# Patient Record
Sex: MALE | Race: WHITE | Employment: UNEMPLOYED | URBAN - METROPOLITAN AREA
[De-identification: names, ages, dates, MRNs, and addresses within clinical notes are randomized per-mention and may not be internally consistent; named-entity substitution may affect disease eponyms.]

---

## 2019-04-16 ENCOUNTER — APPOINTMENT (OUTPATIENT)
Dept: GENERAL RADIOLOGY | Age: 44
End: 2019-04-16
Payer: MEDICARE

## 2019-04-16 ENCOUNTER — APPOINTMENT (OUTPATIENT)
Dept: CT IMAGING | Age: 44
End: 2019-04-16
Payer: MEDICARE

## 2019-04-16 ENCOUNTER — HOSPITAL ENCOUNTER (OUTPATIENT)
Age: 44
Setting detail: OBSERVATION
Discharge: HOME | End: 2019-04-17
Attending: EMERGENCY MEDICINE | Admitting: INTERNAL MEDICINE
Payer: MEDICARE

## 2019-04-16 DIAGNOSIS — G45.9 TIA (TRANSIENT ISCHEMIC ATTACK): Primary | ICD-10-CM

## 2019-04-16 PROBLEM — F43.10 PTSD (POST-TRAUMATIC STRESS DISORDER): Status: ACTIVE | Noted: 2019-04-16

## 2019-04-16 PROBLEM — R29.90 STROKE-LIKE SYMPTOMS: Status: ACTIVE | Noted: 2019-04-16

## 2019-04-16 PROBLEM — I10 HTN (HYPERTENSION): Status: ACTIVE | Noted: 2019-04-16

## 2019-04-16 PROBLEM — E78.5 HLD (HYPERLIPIDEMIA): Status: ACTIVE | Noted: 2019-04-16

## 2019-04-16 LAB
ALBUMIN SERPL-MCNC: 4.2 G/DL (ref 3.5–5.2)
ALP BLD-CCNC: 79 U/L (ref 40–129)
ALT SERPL-CCNC: 25 U/L (ref 0–40)
ANION GAP SERPL CALCULATED.3IONS-SCNC: 8 MMOL/L (ref 7–16)
APTT: 29.8 SEC (ref 24.5–35.1)
AST SERPL-CCNC: 31 U/L (ref 0–39)
BASOPHILS ABSOLUTE: 0 E9/L (ref 0–0.2)
BASOPHILS RELATIVE PERCENT: 0.6 % (ref 0–2)
BILIRUB SERPL-MCNC: 0.8 MG/DL (ref 0–1.2)
BILIRUBIN URINE: NEGATIVE
BLOOD, URINE: NEGATIVE
BUN BLDV-MCNC: 11 MG/DL (ref 6–20)
CALCIUM SERPL-MCNC: 9.1 MG/DL (ref 8.6–10.2)
CHLORIDE BLD-SCNC: 96 MMOL/L (ref 98–107)
CHP ED QC CHECK: YES
CLARITY: CLEAR
CO2: 33 MMOL/L (ref 22–29)
COLOR: YELLOW
CREAT SERPL-MCNC: 1 MG/DL (ref 0.7–1.2)
EOSINOPHILS ABSOLUTE: 0 E9/L (ref 0.05–0.5)
EOSINOPHILS RELATIVE PERCENT: 1.7 % (ref 0–6)
GFR AFRICAN AMERICAN: >60
GFR NON-AFRICAN AMERICAN: >60 ML/MIN/1.73
GLUCOSE BLD-MCNC: 227 MG/DL
GLUCOSE BLD-MCNC: 227 MG/DL (ref 74–99)
GLUCOSE URINE: NEGATIVE MG/DL
HCT VFR BLD CALC: 42.6 % (ref 37–54)
HEMOGLOBIN: 14.8 G/DL (ref 12.5–16.5)
INR BLD: 1.1
KETONES, URINE: NEGATIVE MG/DL
LEUKOCYTE ESTERASE, URINE: NEGATIVE
LYMPHOCYTES ABSOLUTE: 3.06 E9/L (ref 1.5–4)
LYMPHOCYTES RELATIVE PERCENT: 29.8 % (ref 20–42)
MCH RBC QN AUTO: 31.3 PG (ref 26–35)
MCHC RBC AUTO-ENTMCNC: 34.7 % (ref 32–34.5)
MCV RBC AUTO: 90.1 FL (ref 80–99.9)
MONOCYTES ABSOLUTE: 0.71 E9/L (ref 0.1–0.95)
MONOCYTES RELATIVE PERCENT: 7.3 % (ref 2–12)
NEUTROPHILS ABSOLUTE: 6.43 E9/L (ref 1.8–7.3)
NEUTROPHILS RELATIVE PERCENT: 62.9 % (ref 43–80)
NITRITE, URINE: NEGATIVE
PDW BLD-RTO: 12.3 FL (ref 11.5–15)
PH UA: 7 (ref 5–9)
PLATELET # BLD: 272 E9/L (ref 130–450)
PMV BLD AUTO: 9.8 FL (ref 7–12)
POLYCHROMASIA: ABNORMAL
POTASSIUM SERPL-SCNC: 3.4 MMOL/L (ref 3.5–5)
PROTEIN UA: NEGATIVE MG/DL
PROTHROMBIN TIME: 12.5 SEC (ref 9.3–12.4)
RBC # BLD: 4.73 E12/L (ref 3.8–5.8)
SODIUM BLD-SCNC: 137 MMOL/L (ref 132–146)
SPECIFIC GRAVITY UA: <=1.005 (ref 1–1.03)
TOTAL PROTEIN: 7.5 G/DL (ref 6.4–8.3)
TROPONIN: <0.01 NG/ML (ref 0–0.03)
UROBILINOGEN, URINE: 0.2 E.U./DL
WBC # BLD: 10.2 E9/L (ref 4.5–11.5)

## 2019-04-16 PROCEDURE — G0378 HOSPITAL OBSERVATION PER HR: HCPCS

## 2019-04-16 PROCEDURE — 6370000000 HC RX 637 (ALT 250 FOR IP): Performed by: CLINICAL NURSE SPECIALIST

## 2019-04-16 PROCEDURE — 6360000004 HC RX CONTRAST MEDICATION: Performed by: RADIOLOGY

## 2019-04-16 PROCEDURE — 85610 PROTHROMBIN TIME: CPT

## 2019-04-16 PROCEDURE — 71275 CT ANGIOGRAPHY CHEST: CPT

## 2019-04-16 PROCEDURE — 70496 CT ANGIOGRAPHY HEAD: CPT

## 2019-04-16 PROCEDURE — 99219 PR INITIAL OBSERVATION CARE/DAY 50 MINUTES: CPT | Performed by: CLINICAL NURSE SPECIALIST

## 2019-04-16 PROCEDURE — 80053 COMPREHEN METABOLIC PANEL: CPT

## 2019-04-16 PROCEDURE — 96372 THER/PROPH/DIAG INJ SC/IM: CPT

## 2019-04-16 PROCEDURE — 70450 CT HEAD/BRAIN W/O DYE: CPT

## 2019-04-16 PROCEDURE — 71045 X-RAY EXAM CHEST 1 VIEW: CPT

## 2019-04-16 PROCEDURE — 6360000002 HC RX W HCPCS: Performed by: CLINICAL NURSE SPECIALIST

## 2019-04-16 PROCEDURE — 84484 ASSAY OF TROPONIN QUANT: CPT

## 2019-04-16 PROCEDURE — 81003 URINALYSIS AUTO W/O SCOPE: CPT

## 2019-04-16 PROCEDURE — 85025 COMPLETE CBC W/AUTO DIFF WBC: CPT

## 2019-04-16 PROCEDURE — 85730 THROMBOPLASTIN TIME PARTIAL: CPT

## 2019-04-16 PROCEDURE — 0042T CT BRAIN PERFUSION: CPT

## 2019-04-16 PROCEDURE — 36415 COLL VENOUS BLD VENIPUNCTURE: CPT

## 2019-04-16 PROCEDURE — 2580000003 HC RX 258: Performed by: CLINICAL NURSE SPECIALIST

## 2019-04-16 PROCEDURE — 93005 ELECTROCARDIOGRAM TRACING: CPT | Performed by: EMERGENCY MEDICINE

## 2019-04-16 PROCEDURE — 70498 CT ANGIOGRAPHY NECK: CPT

## 2019-04-16 PROCEDURE — 99285 EMERGENCY DEPT VISIT HI MDM: CPT

## 2019-04-16 RX ORDER — PROPRANOLOL HYDROCHLORIDE 20 MG/1
20 TABLET ORAL DAILY
Status: DISCONTINUED | OUTPATIENT
Start: 2019-04-17 | End: 2019-04-17 | Stop reason: HOSPADM

## 2019-04-16 RX ORDER — SODIUM CHLORIDE 0.9 % (FLUSH) 0.9 %
10 SYRINGE (ML) INJECTION EVERY 12 HOURS SCHEDULED
Status: DISCONTINUED | OUTPATIENT
Start: 2019-04-16 | End: 2019-04-17 | Stop reason: HOSPADM

## 2019-04-16 RX ORDER — SODIUM CHLORIDE 0.9 % (FLUSH) 0.9 %
10 SYRINGE (ML) INJECTION
Status: ACTIVE | OUTPATIENT
Start: 2019-04-16 | End: 2019-04-16

## 2019-04-16 RX ORDER — ONDANSETRON 2 MG/ML
4 INJECTION INTRAMUSCULAR; INTRAVENOUS EVERY 6 HOURS PRN
Status: DISCONTINUED | OUTPATIENT
Start: 2019-04-16 | End: 2019-04-17 | Stop reason: HOSPADM

## 2019-04-16 RX ORDER — SODIUM CHLORIDE 0.9 % (FLUSH) 0.9 %
10 SYRINGE (ML) INJECTION PRN
Status: DISCONTINUED | OUTPATIENT
Start: 2019-04-16 | End: 2019-04-17 | Stop reason: HOSPADM

## 2019-04-16 RX ORDER — PANTOPRAZOLE SODIUM 40 MG/1
40 TABLET, DELAYED RELEASE ORAL
Status: DISCONTINUED | OUTPATIENT
Start: 2019-04-16 | End: 2019-04-17 | Stop reason: HOSPADM

## 2019-04-16 RX ADMIN — Medication 10 ML: at 21:06

## 2019-04-16 RX ADMIN — IOPAMIDOL 150 ML: 755 INJECTION, SOLUTION INTRAVENOUS at 16:20

## 2019-04-16 RX ADMIN — ENOXAPARIN SODIUM 40 MG: 40 INJECTION SUBCUTANEOUS at 18:04

## 2019-04-16 RX ADMIN — PANTOPRAZOLE SODIUM 40 MG: 40 TABLET, DELAYED RELEASE ORAL at 18:04

## 2019-04-16 ASSESSMENT — PAIN DESCRIPTION - PAIN TYPE
TYPE: ACUTE PAIN
TYPE: ACUTE PAIN

## 2019-04-16 ASSESSMENT — PAIN DESCRIPTION - LOCATION
LOCATION: HEAD
LOCATION: HEAD

## 2019-04-16 ASSESSMENT — PAIN SCALES - GENERAL
PAINLEVEL_OUTOF10: 7
PAINLEVEL_OUTOF10: 3

## 2019-04-16 ASSESSMENT — PAIN DESCRIPTION - DESCRIPTORS
DESCRIPTORS: DISCOMFORT
DESCRIPTORS: HEADACHE

## 2019-04-16 NOTE — PROGRESS NOTES
Marin Estrada is a 45 y.o. right handed man    Past Medical History:     Past Medical History:   Diagnosis Date    Arthritis     Asthma     Depression     Hyperlipidemia     Hypertension        Past Surgical History:     No past surgical history on file. Allergies:     Patient has no known allergies. Medications:     Prior to Admission medications    Medication Sig Start Date End Date Taking? Authorizing Provider   omeprazole (PRILOSEC) 20 MG capsule Take 20 mg by mouth 2 times daily. Historical Provider, MD   propranolol (INDERAL) 20 MG tablet Take 20 mg by mouth 3 times daily. Historical Provider, MD   naproxen (NAPROSYN) 500 MG tablet Take 500 mg by mouth 2 times daily (with meals). Historical Provider, MD   prazosin (MINIPRESS) 1 MG capsule Take 1 mg by mouth nightly. Historical Provider, MD   methocarbamol (ROBAXIN) 500 MG tablet Take 500 mg by mouth 4 times daily as needed. Historical Provider, MD   sertraline (ZOLOFT) 25 MG tablet Take 25 mg by mouth daily. 2 25MG. TABS AT NIGHT. Historical Provider, MD   Cholecalciferol (VITAMIN D3) 5000 UNITS TABS Take  by mouth. Historical Provider, MD   TESTOSTERONE ENANTHATE IM Inject  into the muscle. Historical Provider, MD       Social History:     Social History     Tobacco Use    Smoking status: Never Smoker    Smokeless tobacco: Never Used   Substance Use Topics    Alcohol use: No    Drug use: No       Review of Systems:     No chest pain or palpitations  No SOB  No vertigo, lightheadedness or loss of consciousness  No falls, tripping or stumbling  No incontinence of bowels or bladder  No itching or bruising appreciated    ROS otherwise negative     Family History:     No family history on file. History of Present Illness:     Patient was admitted with difficulty speaking and left facial numbness. There was concern for stroke but during his admission in ED, his language difficulties improved.     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     He states he has reactions similar to PTSD    He denies any vertigo; visual changes nausea vomiting or diarrhea     States he gets swelling in his neck and suffers with apnea   Underwent surgery and has a device implanted but does not need Cpap    No prior stroke or seizure     He does not sleep well despite surgery   He does not follow with sleep medicine     Objective:   BP (!) 140/94   Pulse 77   Temp 97.5 °F (36.4 °C) (Temporal)   Resp 17   Wt 270 lb 14.4 oz (122.9 kg)   SpO2 96%   BMI 36.74 kg/m²      General appearance: alert, appears stated age and cooperative  Head: Normocephalic, without obvious abnormality, atraumatic  Neck: no adenopathy, no carotid bruit and limited ROM  Lungs: clear to auscultation bilaterally  Heart: regular rate and rhythm  Extremities: no cyanosis or edema  Pulses: 2+ and symmetric  Skin: no rashes or lesions     Mental Status: Alert, oriented to person place and year     Speech: clear  Language: appropriate     Cranial Nerves:  I: smell    II: visual acuity     II: visual fields Full    II: pupils LUPE   III,VII: ptosis None   III,IV,VI: extraocular muscles  EOMI without nystagmus    V: mastication Normal   V: facial light touch sensation  Splits at midline with tuning fork    V,VII: corneal reflex  Present   VII: facial muscle function - upper     VII: facial muscle function - lower Normal   VIII: hearing Normal   IX: soft palate elevation  Normal   IX,X: gag reflex Present   XI: trapezius strength  5/5   XI: sternocleidomastoid strength 5/5   XI: neck extension strength  5/5   XII: tongue strength  Normal     Motor:  5/5 throughout  Normal bulk and tone     Sensory:  LT and PP normal in arms and legs   Vibration minimally decreased in ankles     Coordination:   FN, FFM and BERTRAND normal  HS normal    DTR:   No reflexes    No Babinski  No

## 2019-04-16 NOTE — ED PROVIDER NOTES
Department of Emergency Medicine   ED  Provider Note  Admit Date/RoomTime: 4/16/2019  2:18 PM  ED Room: 17A/17A-17        4/16/19  2:39 PM      HISTORY OF PRESENT ILLNESS:  (Nurses Notes Reviewed)    Chief Complaint:   Aphasia (started about [phone], family reports pulse ox was in the 80's. Patient has a multitude of problems)      Source of history provided by:  patient and spouse/SO. History/Exam Limitations: none. Soo Feldman is a 45 y.o. old male presenting to the emergency department by EMS, with sudden onset of left facial numbness, headache and speech changes, which began 145pm today. Last known well time: 145pm today. The episode occurred at home. Since recognized the symptoms have been improving. He has no neurologic history. He has stroke risk factors of: hypertension. There have been associated symptoms of headache. There has been no history of recent trauma. Reports 145pm today, onset of left sided headache, difficulty expressing words and finding words and left sided facial numbness. Similar episodes x 3 in the last few weeks. Speech has improved since then but still with mild facial numbness. Denies other neurologic deficits. Code Status on file: No Order. NIH Stroke Scale at time of initial evaluation:   NIH/MNHISS Stroke Scale  Interval: Baseline  Level of Consciousness (1a. ): Alert  LOC Questions (1b. ):  Answers both correctly  LOC Commands (1c. ): Obeys both correctly  Best Gaze (2. ): Normal  Visual (3. ): No visual loss  Facial Palsy (4. ): Normal  Motor Arm, Left (5a. ): No drift  Motor Arm, Right (5b. ): No drift  Motor Leg, Left (6a. ): No drift  Motor Leg, Right (6b. ): No drift  Limb Ataxia (7. ): Absent  Sensory (8. ): (!) Partial loss  Best Language (9. ): No aphasia  Dysarthria (10. ): Normal  Extinction and Inattention (11): No neglect  Total: 1      Past Medical History:  has a past medical history of Arthritis, Asthma, Depression, Hyperlipidemia, and Hypertension. Past Surgical History:  has no past surgical history on file. Social History:  reports that he has never smoked. He has never used smokeless tobacco. He reports that he does not drink alcohol or use drugs. Prior Functional Status(Modified Robertson Scale):  0=No symptoms at all    Family History: family history is not on file. The patients home medications have been reviewed. Prior to Admission medications    Medication Sig Start Date End Date Taking? Authorizing Provider   omeprazole (PRILOSEC) 20 MG capsule Take 20 mg by mouth 2 times daily. Historical Provider, MD   propranolol (INDERAL) 20 MG tablet Take 20 mg by mouth 3 times daily. Historical Provider, MD   naproxen (NAPROSYN) 500 MG tablet Take 500 mg by mouth 2 times daily (with meals). Historical Provider, MD   prazosin (MINIPRESS) 1 MG capsule Take 1 mg by mouth nightly. Historical Provider, MD   methocarbamol (ROBAXIN) 500 MG tablet Take 500 mg by mouth 4 times daily as needed. Historical Provider, MD   sertraline (ZOLOFT) 25 MG tablet Take 25 mg by mouth daily. 2 25MG. TABS AT NIGHT. Historical Provider, MD   Cholecalciferol (VITAMIN D3) 5000 UNITS TABS Take  by mouth. Historical Provider, MD   TESTOSTERONE ENANTHATE IM Inject  into the muscle. Historical Provider, MD       Allergies: Patient has no known allergies. Review of Systems:   Pertinent positives and negatives are stated within HPI, all other systems reviewed and are negative.    ---------------------------------------------------PHYSICAL EXAM--------------------------------------    Constitutional/General: Alert and oriented x3, well appearing, non toxic in NAD  Head: Normocephalic and atraumatic  Eyes: PERRL, EOMI  Mouth: Oropharynx clear, handling secretions, no trismus.  no facial droop  Neck: Supple, full ROM, non tender to palpation in the midline, no stridor, no crepitus, no meningeal signs  Pulmonary: Lungs clear to auscultation bilaterally, no wheezes, rales, or rhonchi. Not in respiratory distress  Cardiovascular:  Regular rate. Regular rhythm. No murmurs, gallops, or rubs. 2+ distal pulses  Abdomen: Soft. Non tender. Non distended. +BS. No rebound, guarding, or rigidity. No pulsatile masses appreciated. Musculoskeletal: Moves all extremities x 4. Warm and well perfused, no clubbing, cyanosis, or edema. Capillary refill <3 seconds  Skin: warm and dry. No rashes. Neurologic: GCS 15, except for left facial numbness CN 2-12 grossly intact, no focal deficits, symmetric strength 5/5 in the upper and lower extremities bilaterally. Speech normal. Normal finger to nose. No drift. Please also see NIH stroke scale.   Psych: Normal Affect      -------------------------------------------------- RESULTS -------------------------------------------------  All laboratory and imaging studies have been reviewed by myself    LABS:  Results for orders placed or performed during the hospital encounter of 04/16/19   CBC Auto Differential   Result Value Ref Range    WBC 10.2 4.5 - 11.5 E9/L    RBC 4.73 3.80 - 5.80 E12/L    Hemoglobin 14.8 12.5 - 16.5 g/dL    Hematocrit 42.6 37.0 - 54.0 %    MCV 90.1 80.0 - 99.9 fL    MCH 31.3 26.0 - 35.0 pg    MCHC 34.7 (H) 32.0 - 34.5 %    RDW 12.3 11.5 - 15.0 fL    Platelets 815 891 - 548 E9/L    MPV 9.8 7.0 - 12.0 fL    Neutrophils % 62.9 43.0 - 80.0 %    Lymphocytes % 29.8 20.0 - 42.0 %    Monocytes % 7.3 2.0 - 12.0 %    Eosinophils % 1.7 0.0 - 6.0 %    Basophils % 0.6 0.0 - 2.0 %    Neutrophils # 6.43 1.80 - 7.30 E9/L    Lymphocytes # 3.06 1.50 - 4.00 E9/L    Monocytes # 0.71 0.10 - 0.95 E9/L    Eosinophils # 0.00 (L) 0.05 - 0.50 E9/L    Basophils # 0.00 0.00 - 0.20 E9/L    Polychromasia 1+    Protime-INR   Result Value Ref Range    Protime 12.5 (H) 9.3 - 12.4 sec    INR 1.1    APTT   Result Value Ref Range    aPTT 29.8 24.5 - 35.1 sec       RADIOLOGY:  Interpreted by Radiologist.  CTA PULMONARY W CONTRAST   Final Result   Normal CT angiogram of the chest. No evidence of pulmonary emboli               CT Head WO Contrast   Final Result   Unremarkable scan of the head. The findings were called to Dr. Dede Wells at 2:50 PM      XR CHEST PORTABLE   Final Result   No radiographic evidence of acute cardiopulmonary disease. CTA Head W Contrast    (Results Pending)   CTA Neck W Contrast    (Results Pending)   CT Brain Perfusion    (Results Pending)       EKG Interpretation  Interpreted by emergency department physician. Rhythm: normal sinus   Rate: normal  Axis: normal  Conduction: normal  ST Segments: no acute change  T Waves: no acute change    Clinical Impression: Sinus rhythm, no acute ischemic changes    Comparison to Old EKG  No old ekg          ------------------------- NURSING NOTES AND VITALS REVIEWED ---------------------------   The nursing notes within the ED encounter and vital signs as below have been reviewed. BP (!) 140/94   Pulse 77   Temp 97.5 °F (36.4 °C) (Temporal)   Resp 17   Wt 270 lb 14.4 oz (122.9 kg)   SpO2 96%   BMI 36.74 kg/m²   Oxygen Saturation Interpretation: Normal    The patients available past medical records and past encounters were reviewed. ------------------------------ ED COURSE/MEDICAL DECISION MAKING----------------------  Medications   sodium chloride flush 0.9 % injection 10 mL (has no administration in time range)   iopamidol (ISOVUE-370) 76 % injection 120 mL (has no administration in time range)              Acute CVA Core Measures:      NIH Stroke Scale Total: Total: 1    Medical Decision Making:    He is improving  Speech back to normal  Sensation improving as well  Not a TPA candidate as he is rapidly improving, NIH 1  No motor weakness  CT neg  Neurology evaluated as well  They agree with admission  Sound to admit    Re-Evaluations:             Re-evaluation.   Patients symptoms are improving    This patient's ED course included: a personal history and physicial examination, re-evaluation prior to disposition, multiple bedside re-evaluations, IV medications, cardiac monitoring, continuous pulse oximetry and complex medical decision making and emergency management    This patient has remained hemodynamically stable during their ED course. Consultations: The case has been discussed with Dr. Linda Huggins Card they will admit the patient        --------------------------------- IMPRESSION AND DISPOSITION ---------------------------------    IMPRESSION  1.  TIA (transient ischemic attack)        DISPOSITION  Disposition: Admit to telemetry  Patient condition is stable             Alma Delia Torres MD  04/16/19 [id number]

## 2019-04-16 NOTE — H&P
Hospital Medicine History & Physical      PCP: No primary care provider on file. Date of Admission: 4/16/2019    Date of Service: Pt seen/examined on 4/16/19 Placed in Observation. Chief Complaint:  Left sided weakness      History Of Present Illness:     45 y.o. male who presented to [address] [place] [address] with left sided weakness. History obtained from the patient. He states he has been having multiple episodes of generalized weakness, slurred speech, forgetfulness, and headaches for an unknown period of time. The latest episode was this afternoon while at home when he began to have left facial numbness. According to his wife at the bedside, he has been having periods of difficulty walking and slurred speech for a very long time. He states he has increased dizziness, especially when standing too fast. He states he often has palpitations in his chest. 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 Additionally, the wife states he often has a low oxygen saturation and has periods of apnea while sleeping. A chest CTA was obtained in the ED  He as PMH of asthma, HTN, HLD and PTSD  Upon exam, he is alert and oriented to person, place and time. He has no facial droop, slurred speech and is moving all extremities equally. Past Medical History:          Diagnosis Date    Arthritis     Asthma     Depression     Hyperlipidemia     Hypertension        Past Surgical History:      No past surgical history on file. Medications Prior to Admission:      Prior to Admission medications    Medication Sig Start Date End Date Taking? Authorizing Provider   omeprazole (PRILOSEC) 20 MG capsule Take 20 mg by mouth 2 times daily. Historical Provider, MD   propranolol (INDERAL) 20 MG tablet Take 20 mg by mouth daily  4/17/19   Historical Provider, MD   naproxen (NAPROSYN) 500 MG tablet Take 500 mg by mouth 2 times daily (with meals).     Historical Provider, MD methocarbamol (ROBAXIN) 500 MG tablet Take 500 mg by mouth 4 times daily as needed. Historical Provider, MD   sertraline (ZOLOFT) 25 MG tablet Take 25 mg by mouth daily. 2 25MG. TABS AT NIGHT. Historical Provider, MD   Cholecalciferol (VITAMIN D3) 5000 UNITS TABS Take  by mouth. Historical Provider, MD   TESTOSTERONE ENANTHATE IM Inject  into the muscle. Historical Provider, MD       Allergies:  Patient has no known allergies. Social History:      The patient currently lives with his wife. TOBACCO:   reports that he has never smoked. He has never used smokeless tobacco.  ETOH:   reports that he does not drink alcohol. Family History:      . Positive as follows:        Problem Relation Age of Onset    Heart Disease Father     Cancer Paternal Grandmother        REVIEW OF SYSTEMS:   Pertinent positives as noted in the HPI. All other systems reviewed and negative. PHYSICAL EXAM:    /82   Pulse 78   Temp 98.2 °F (36.8 °C) (Temporal)   Resp 18   Ht 6' (1.829 m)   Wt 258 lb 0.3 oz (117 kg)   SpO2 93%   BMI 34.99 kg/m²     General appearance:  No apparent distress, appears stated age and cooperative. HEENT:  Normal cephalic, atraumatic without obvious deformity. Pupils equal, round, and reactive to light. Extra ocular muscles intact. Conjunctivae/corneas clear. Neck: Supple, with full range of motion. No jugular venous distention. Trachea midline. Respiratory:  Normal respiratory effort. Clear to auscultation, bilaterally without Rales/Wheezes/Rhonchi. Cardiovascular:  Regular rate and rhythm with normal S1/S2 without murmurs, rubs or gallops. Abdomen: Soft, non-tender, non-distended with normal bowel sounds. Musculoskeletal:  No clubbing, cyanosis or edema bilaterally. Full range of motion without deformity. Skin: Skin color, texture, turgor normal.  No rashes or lesions. Neurologic:  Neurovascularly intact without any focal sensory/motor deficits.    Psychiatric:  Alert and oriented, thought content appropriate, normal insight  Capillary Refill: Brisk,< 3 seconds   Peripheral Pulses: +2 palpable, equal bilaterally       CXR:  I have reviewed the CXR with the following interpretation: NAD      Labs:     Recent Labs     04/16/19  1510   WBC 10.2   HGB 14.8   HCT 42.6        Recent Labs     04/16/19  1510      K 3.4*   CL 96*   CO2 33*   BUN 11   CREATININE 1.0   CALCIUM 9.1     Recent Labs     04/16/19  1510   AST 31   ALT 25   BILITOT 0.8   ALKPHOS 79     Recent Labs     04/16/19  1510   INR 1.1     Recent Labs     04/16/19  1510   TROPONINI <0.01       Urinalysis:      Lab Results   Component Value Date    NITRU Negative 04/16/2019    WBCUA NONE 02/11/2014    BACTERIA NONE 02/11/2014    RBCUA NONE 02/11/2014    BLOODU Negative 04/16/2019    SPECGRAV <=1.005 04/16/2019    GLUCOSEU Negative 04/16/2019     CTbrain  No significant ischemic penumbra identified    CTA head/neck:  Essentially negative CTA of the head and neck    CTA chest  Normal CT angiogram of the chest. No evidence of pulmonary emboli    CT  Head:  Unremarkable scan of the head    ASSESSMENT:    Active Hospital Problems    Diagnosis Date Noted    Stroke-like symptoms [R29.90] 04/16/2019    HTN (hypertension) [I10] 04/16/2019    HLD (hyperlipidemia) [E78.5] 04/16/2019    PTSD (post-traumatic stress disorder) [F43.10] 04/16/2019    TIA (transient ischemic attack) [G45.9]        PLAN:  Consult neurology  Echo  Resume home medications  Monitor labs  Monitor BP  MRI brain  Lipid panel  A1c  Neuro checks    DVT Prophylaxis: lovenox  Diet: DIET CARDIAC;  Code Status: Full Code    PT/OT Eval Status: ordered    Dispo - observation telemetry       CASSIE Barton - CNS    Thank you No primary care provider on file. for the opportunity to be involved in this patient's care. If you have any questions or concerns please feel free to contact me at [phone].           Hospitalist Attestation Note    The

## 2019-04-17 ENCOUNTER — APPOINTMENT (OUTPATIENT)
Dept: MRI IMAGING | Age: 44
End: 2019-04-17
Payer: MEDICARE

## 2019-04-17 VITALS
HEIGHT: 72 IN | DIASTOLIC BLOOD PRESSURE: 66 MMHG | HEART RATE: 72 BPM | SYSTOLIC BLOOD PRESSURE: 105 MMHG | WEIGHT: 258.02 LBS | RESPIRATION RATE: 18 BRPM | TEMPERATURE: 98.9 F | BODY MASS INDEX: 34.95 KG/M2 | OXYGEN SATURATION: 96 %

## 2019-04-17 LAB
ANION GAP SERPL CALCULATED.3IONS-SCNC: 14 MMOL/L (ref 7–16)
BUN BLDV-MCNC: 10 MG/DL (ref 6–20)
CALCIUM SERPL-MCNC: 9 MG/DL (ref 8.6–10.2)
CHLORIDE BLD-SCNC: 95 MMOL/L (ref 98–107)
CHOLESTEROL, TOTAL: 208 MG/DL (ref 0–199)
CO2: 29 MMOL/L (ref 22–29)
CREAT SERPL-MCNC: 0.9 MG/DL (ref 0.7–1.2)
EKG ATRIAL RATE: 78 BPM
EKG P AXIS: 41 DEGREES
EKG P-R INTERVAL: 154 MS
EKG Q-T INTERVAL: 398 MS
EKG QRS DURATION: 96 MS
EKG QTC CALCULATION (BAZETT): 453 MS
EKG R AXIS: 36 DEGREES
EKG T AXIS: 47 DEGREES
EKG VENTRICULAR RATE: 78 BPM
GFR AFRICAN AMERICAN: >60
GFR NON-AFRICAN AMERICAN: >60 ML/MIN/1.73
GLUCOSE BLD-MCNC: 162 MG/DL (ref 74–99)
HBA1C MFR BLD: 7.8 % (ref 4–5.6)
HDLC SERPL-MCNC: 42 MG/DL
LDL CHOLESTEROL CALCULATED: 125 MG/DL (ref 0–99)
MAGNESIUM: 2.2 MG/DL (ref 1.6–2.6)
POTASSIUM REFLEX MAGNESIUM: 3.3 MMOL/L (ref 3.5–5)
SODIUM BLD-SCNC: 138 MMOL/L (ref 132–146)
TRIGL SERPL-MCNC: 207 MG/DL (ref 0–149)
VLDLC SERPL CALC-MCNC: 41 MG/DL

## 2019-04-17 PROCEDURE — 2580000003 HC RX 258: Performed by: CLINICAL NURSE SPECIALIST

## 2019-04-17 PROCEDURE — 70551 MRI BRAIN STEM W/O DYE: CPT

## 2019-04-17 PROCEDURE — 99232 SBSQ HOSP IP/OBS MODERATE 35: CPT | Performed by: NURSE PRACTITIONER

## 2019-04-17 PROCEDURE — 83735 ASSAY OF MAGNESIUM: CPT

## 2019-04-17 PROCEDURE — 96372 THER/PROPH/DIAG INJ SC/IM: CPT

## 2019-04-17 PROCEDURE — 80048 BASIC METABOLIC PNL TOTAL CA: CPT

## 2019-04-17 PROCEDURE — 80061 LIPID PANEL: CPT

## 2019-04-17 PROCEDURE — 36415 COLL VENOUS BLD VENIPUNCTURE: CPT

## 2019-04-17 PROCEDURE — G0378 HOSPITAL OBSERVATION PER HR: HCPCS

## 2019-04-17 PROCEDURE — 6360000002 HC RX W HCPCS: Performed by: CLINICAL NURSE SPECIALIST

## 2019-04-17 PROCEDURE — 6370000000 HC RX 637 (ALT 250 FOR IP): Performed by: INTERNAL MEDICINE

## 2019-04-17 PROCEDURE — 97161 PT EVAL LOW COMPLEX 20 MIN: CPT

## 2019-04-17 PROCEDURE — 83036 HEMOGLOBIN GLYCOSYLATED A1C: CPT

## 2019-04-17 PROCEDURE — 6370000000 HC RX 637 (ALT 250 FOR IP): Performed by: CLINICAL NURSE SPECIALIST

## 2019-04-17 RX ORDER — POTASSIUM CHLORIDE 20 MEQ/1
40 TABLET, EXTENDED RELEASE ORAL ONCE
Status: COMPLETED | OUTPATIENT
Start: 2019-04-17 | End: 2019-04-17

## 2019-04-17 RX ADMIN — POTASSIUM CHLORIDE 40 MEQ: 20 TABLET, EXTENDED RELEASE ORAL at 10:45

## 2019-04-17 RX ADMIN — PROPRANOLOL HYDROCHLORIDE 20 MG: 20 TABLET ORAL at 08:00

## 2019-04-17 RX ADMIN — PANTOPRAZOLE SODIUM 40 MG: 40 TABLET, DELAYED RELEASE ORAL at 08:01

## 2019-04-17 RX ADMIN — ENOXAPARIN SODIUM 40 MG: 40 INJECTION SUBCUTANEOUS at 09:29

## 2019-04-17 RX ADMIN — Medication 10 ML: at 08:00

## 2019-04-17 ASSESSMENT — PAIN SCALES - GENERAL
PAINLEVEL_OUTOF10: 0

## 2019-04-17 NOTE — PROGRESS NOTES
Hugo Levine is a 45 y.o. right handed male     Neurology following for difficulty speaking and left facial numbness    Wife at bedside    Hx of arthritis, asthma, depression, HLD, HTN, PTSD, CAREY (no CPAP needed has device inserted)     Presented with difficulty speaking and left facial weakness concern for stroke  ---symptoms improved while in ED  ---war  with Mefloquine poisoning   ---states it effected his brainstem    His symptoms have totally resolved  Discussed MRI of brain results and that they are negative  Discussed ECHO was ordered  Asked him if any stressors in his life and he said he has no stressors  ---wife did recently have abdominal surgery and he needed to care for her and their 4 children    No chest pain or palpitations  No coughing or wheezing, +shortness of breath  No vertigo, lightheadedness or loss of consciousness  No falls, tripping or stumbling  No incontinence of bowels or bladder  No itching or bruising appreciated  No numbness, tingling or focal arm/leg weakness    ROS otherwise negative     Objective:     /75   Pulse 70   Temp 97.4 °F (36.3 °C) (Temporal)   Resp 18   Ht 6' (1.829 m)   Wt 258 lb 0.3 oz (117 kg)   SpO2 96%   BMI 34.99 kg/m²     General: Patient lying bed in NAD. Appears well nourished and well developed. Head:  Normocephalic and atraumatic  Eyes: Sclera white, conjunctiva pink  Neck:  Trachea midline. Neck supple and normal ROM, no bruits, no lymphadenopathy  Lungs: Clear to auscultation bilaterally, respirations unlabored  Heart: Regular rate and rhythm  Abdomen: Soft with hyperactive bowel sounds in all quadrants   Extremities: No edema to BLE, +2 pulses bilaterally  Skin: No lesions or rashes     Mental Status: Alert and oriented x 3. Follows all commands.     Appropriate attention/concentration  Intact fundus of knowledge  Repetition intact  Intact memories      Speech: no dysarthria  Language: no aphasias     Cranial Nerves:  I: smell NA II: visual acuity  NA   II: visual fields Full to confrontation   II: pupils LUPE   III,VII: ptosis None   III,IV,VI: extraocular muscles  Full ROM   V: mastication Normal   V: facial light touch sensation  Normal   V,VII: corneal reflex     VII: facial muscle function - upper  Normal   VII: facial muscle function - lower Normal   VIII: hearing Normal   IX: soft palate elevation  Normal   IX,X: gag reflex    XI: trapezius strength  5/5   XI: sternocleidomastoid strength 5/5   XI: neck extension strength  5/5   XII: tongue strength  Normal     Motor:  5/5 strength throughout  Normal muscle tone and bulk  No abnormal movements or tremors  No drift    Sensory:  LT, PP and Vibration intact     Coordination:   FNF, HS and FFM intact bilaterally     DTR:   Right Brachioradialis reflex 1+  Left Brachioradialis reflex 1+  Right Biceps reflex 1+  Left Biceps reflex 1+  Right Triceps reflex 1+  Left Triceps reflex 1+  Right Quadriceps reflex 0  Left Quadriceps reflex 0  Right Achilles reflex 0  Left Achilles reflex 0    No Babinskis  No Mcknight's    No pathological reflexes    Laboratory/Radiology:     CBC with Differential:    Lab Results   Component Value Date    WBC 10.2 04/16/2019    RBC 4.73 04/16/2019    HGB 14.8 04/16/2019    HCT 42.6 04/16/2019     04/16/2019    MCV 90.1 04/16/2019    MCH 31.3 04/16/2019    MCHC 34.7 04/16/2019    RDW 12.3 04/16/2019    SEGSPCT 65 02/11/2014    LYMPHOPCT 29.8 04/16/2019    MONOPCT 7.3 04/16/2019    BASOPCT 0.6 04/16/2019    MONOSABS 0.71 04/16/2019    LYMPHSABS 3.06 04/16/2019    EOSABS 0.00 04/16/2019    BASOSABS 0.00 04/16/2019     CMP:    Lab Results   Component Value Date     04/17/2019    K 3.3 04/17/2019    CL 95 04/17/2019    CO2 29 04/17/2019    BUN 10 04/17/2019    CREATININE 0.9 04/17/2019    GFRAA >60 04/17/2019    LABGLOM >60 04/17/2019    GLUCOSE 162 04/17/2019    PROT 7.5 04/16/2019    LABALBU 4.2 04/16/2019    CALCIUM 9.0 04/17/2019    BILITOT 0.8

## 2019-04-17 NOTE — PROGRESS NOTES
OCCUPATIONAL THERAPY    Date:2019  Patient Name: Ya Poole  MRN: [id number]  : 1975  Room: [address] [place] [address]    Chart reviewed. Attempted OT evaluation this pm. Pt found sitting up in bed reporting no complaints and symptoms resolving. Role of OT reviewed with patient. Pt reports he has been up and ambulating to bathroom without difficulty. Pt reports he is at his baseline status. Reports periods of feeling dizzy 5-6 tx/week at home. Pt with no current complaints of dizziness. Pt denies current pain, vision changes or OT needs at this time. Will discontinue current consult. Please re-order if indicated. Thank you.   Sánchez Grewal, OTR/L [id number]

## 2019-04-17 NOTE — PROGRESS NOTES
[facility] Initial Evaluation  Name: Luis Miguel Spivey  : 1975  MRN: [id number]    Date of Service: 2019    Evaluating Therapist: Bryan Hendricks PT, JORDYN  [id number]    Room #: 2500/2432-W  DIAGNOSIS: Stroke like symptoms  PRECAUTIONS: Low Fall risk   PMH: OA, Asthma, Depression, HLD, HTN     Social:  Pt lives with his wife and 4 children in a 2 story home with 4 stairs and B rail/s to enter. There is a flight of steps and 1 rail/s to second floor. Pt reports being able to stay on 1st or 2nd floor. Prior to admission pt ambulated with no AD, but owns a SPC. Initial Evaluation  Date:    Was pt agreeable to Eval/treatment? Yes   Does pt have pain? Chronic neck, back, shoulder pain 7/10  HA 2/10   Bed Mobility  Rolling: Independent   Supine to sit: Independent   Sit to supine: Independent   Scooting: Independent    Transfers Sit to stand: Independent   Stand to sit: Independent   Stand pivot: Independent    Ambulation   900 feet using no AD with [place]   Stair negotiation:  12 steps using 1 rail with Mod [place]   ROM RLE: WFL  LLE: WFL   Strength RLE: 5/5  LLE: 5/5   Balance   Sitting: Independent   Standing: Independent    AM-PAC Basic Mobility Inpatient Short Form Raw Score:  24/24     Patient is A&Ox4. Sensation: pt denies numbness/tingling   Coordination: Intact  Edema: none noted     ASSESSMENT  Pt displays functional ability as noted in the objective portion of this evaluation. Comments/Treatment:  Pt was cleared by RN prior to PT evaluation. Pt was received supine and was agreeable to PT evaluation. Pt amb with good gait speed and no LOB occurred. Pt performed stairs non-reciprocally and had no difficulty. Pt reports that he occasionally has difficulty with extended periods of dizziness 5-6x/week that lasts for several hours and then subsides. Pt returned to supine and was left with call button within reach and all needs met.      Patient

## 2024-10-07 ENCOUNTER — TELEPHONE (OUTPATIENT)
Dept: BARIATRICS/WEIGHT MGMT | Age: 49
End: 2024-10-07

## 2024-10-07 NOTE — TELEPHONE ENCOUNTER
A first call was made in an attempt to reach this patient for a referral we received. The patient's phone number was disconnected.    Letter sent

## 2024-10-17 ENCOUNTER — OFFICE VISIT (OUTPATIENT)
Dept: BARIATRICS/WEIGHT MGMT | Age: 49
End: 2024-10-17
Payer: OTHER GOVERNMENT

## 2024-10-17 VITALS
TEMPERATURE: 97.9 F | WEIGHT: 239.8 LBS | DIASTOLIC BLOOD PRESSURE: 85 MMHG | HEART RATE: 87 BPM | HEIGHT: 73 IN | SYSTOLIC BLOOD PRESSURE: 123 MMHG | BODY MASS INDEX: 31.78 KG/M2

## 2024-10-17 DIAGNOSIS — E66.09 CLASS 1 OBESITY DUE TO EXCESS CALORIES WITH SERIOUS COMORBIDITY AND BODY MASS INDEX (BMI) OF 31.0 TO 31.9 IN ADULT: Primary | ICD-10-CM

## 2024-10-17 DIAGNOSIS — I10 PRIMARY HYPERTENSION: ICD-10-CM

## 2024-10-17 DIAGNOSIS — E11.9 TYPE 2 DIABETES MELLITUS WITHOUT COMPLICATION, WITHOUT LONG-TERM CURRENT USE OF INSULIN (HCC): ICD-10-CM

## 2024-10-17 DIAGNOSIS — E66.811 CLASS 1 OBESITY DUE TO EXCESS CALORIES WITH SERIOUS COMORBIDITY AND BODY MASS INDEX (BMI) OF 31.0 TO 31.9 IN ADULT: Primary | ICD-10-CM

## 2024-10-17 PROCEDURE — 3074F SYST BP LT 130 MM HG: CPT | Performed by: INTERNAL MEDICINE

## 2024-10-17 PROCEDURE — 99205 OFFICE O/P NEW HI 60 MIN: CPT | Performed by: INTERNAL MEDICINE

## 2024-10-17 PROCEDURE — 99202 OFFICE O/P NEW SF 15 MIN: CPT

## 2024-10-17 PROCEDURE — 3079F DIAST BP 80-89 MM HG: CPT | Performed by: INTERNAL MEDICINE

## 2024-10-17 PROCEDURE — G2212 PROLONG OUTPT/OFFICE VIS: HCPCS | Performed by: INTERNAL MEDICINE

## 2024-10-17 RX ORDER — PROPRANOLOL HYDROCHLORIDE 160 MG/1
160 CAPSULE, EXTENDED RELEASE ORAL DAILY
COMMUNITY

## 2024-10-17 RX ORDER — ATORVASTATIN CALCIUM 10 MG/1
10 TABLET, FILM COATED ORAL DAILY
COMMUNITY

## 2024-10-17 RX ORDER — PANTOPRAZOLE SODIUM 40 MG/1
40 TABLET, DELAYED RELEASE ORAL 2 TIMES DAILY
COMMUNITY

## 2024-10-17 RX ORDER — AMITRIPTYLINE HYDROCHLORIDE 50 MG/1
50 TABLET ORAL NIGHTLY
COMMUNITY

## 2024-10-17 RX ORDER — ONDANSETRON 4 MG/1
4 TABLET, FILM COATED ORAL EVERY 12 HOURS PRN
COMMUNITY

## 2024-10-17 RX ORDER — LORATADINE 10 MG/1
10 TABLET ORAL DAILY
COMMUNITY

## 2024-10-17 RX ORDER — AMLODIPINE BESYLATE 2.5 MG/1
2.5 TABLET ORAL DAILY
COMMUNITY

## 2024-10-17 RX ORDER — PREGABALIN 150 MG/1
150 CAPSULE ORAL 2 TIMES DAILY
COMMUNITY

## 2024-10-17 RX ORDER — SEMAGLUTIDE 1.34 MG/ML
1 INJECTION, SOLUTION SUBCUTANEOUS WEEKLY
COMMUNITY

## 2024-10-17 NOTE — PATIENT INSTRUCTIONS
Requirements:  Make sure protein intake is at least 120 grams per day (do not count protein every day; instead spot check your intake every 2-3 weeks and make sure what you think you are getting is close to accurate; consider using a protein shake if needed; these are in the pharmacy section of the stores, not the grocery section; Premier, Pure Protein and Fairlife are relatively inexpensive and taste good to most patients; other options are Nectar, Boost Max, Ensure Max, BeneProtein and GNC lean (which is lactose-free);   Nectar fruit, Premier Protein Clear, IsoPure Protein Drink, and Protein 2 O are water-based options; Quest (or Cosco, which is cheaper and is ordered on Amazon) and the [employer] protein bars can also be used, but have less protein in them )  (Disclaimer: Dietary supplements rarely have their listed ingredients and the amount of each verified by a third party other. Sometimes they give verification for their claims to be GMO and gluten free and to be organic. However, even such verifications as these may still be untrustworthy.)  Make sure fiber intake is at least 28 grams/day. Do this in part or whole by taking 12 tablespoons of [employer]'s Fiber One original, plain cereal (or "[employer]s All Bran Buds cereal) or 4 tablespoons of wheat dextrin powder (Benefiber or generic brand). For both of these, start with 1/8th - 1/4th the target amount and every week add another 1/8th - 1/4th until reaching the target).  Also, fiber gummies containing inulin (such as Nature Made, [place], Benefiber) or Fiber Choice Pre-biotic tablets containing inulin are options. 1 1/2 cup of beans (not green beans, barbeque beans, baked beans or pork and beans) or peas is an excellent food source of fiber. Low calorie, high fiber bread products containing modified wheat starch can be used. An example is the [employer] Xtreme Wellness High Fiber Carb Lean tortilla (7grams in 30 calories).  All of these fiber

## 2024-10-17 NOTE — PROGRESS NOTES
Energy Needs =  2915 Sd/d  Weight effect of high risk foods =  Restaurant 1050 Sd/wk + Sweet 150 + SSB 1030 = 2230 Sd/wk = 315 Sd/d = 10% DEN = 31 lbs/yr  States he does not each much food  Whatever amount it is, it has provided him with a neutral calorie intake for the past three years per his lack of wt change  Therefore, the best option is to focus on his HR food consumption.  To that end, I will recommend targeted reduction of SSBs, Restaurant food and Sweets  He will cont in the move program  He is not a candidate for one of the oral appetite suppressants. Had side effects with topiramate. No weight loss with bupropion. Cannot take Contrave b/c of chronic back pain.  Unable to increase Ozempic b/c of GI side effects (has been on the 1 mg dose for around 3 years)  States he had the same GI side effects at 0.5 mg and they were worse than now.  Uses soda or a candy bar to bring down his BG when it runs high (reactive hypoglycemia?). It never goes below 70 mg/dl, though.  Needs to see our endo/diabetes clinic for management of his glycemic control and diabetes  Assessment of Obesity - Uncontrolled  Plan -     Requirements:  Make sure protein intake is at least 120 grams per day (do not count protein every day; instead spot check your intake every 2-3 weeks and make sure what you think you are getting is close to accurate; consider using a protein shake if needed; these are in the pharmacy section of the stores, not the grocery section; Premier, Pure Protein and Fairlife are relatively inexpensive and taste good to most patients; other options are Nectar, Boost Max, Ensure Max, BeneProtein and GNC lean (which is lactose-free);   Nectar fruit, Premier Protein Clear, IsoPure Protein Drink, and Protein 2 O are water-based options; Quest (or Cosco, which is cheaper and is ordered on Amazon) and the [employer] protein bars can also be used, but have less protein in them )  (Disclaimer: Dietary supplements rarely have

## 2024-12-06 ENCOUNTER — HOSPITAL ENCOUNTER (EMERGENCY)
Age: 49
Discharge: HOME | End: 2024-12-06
Payer: OTHER GOVERNMENT

## 2024-12-06 ENCOUNTER — APPOINTMENT (OUTPATIENT)
Dept: GENERAL RADIOLOGY | Age: 49
End: 2024-12-06
Payer: OTHER GOVERNMENT

## 2024-12-06 VITALS
DIASTOLIC BLOOD PRESSURE: 85 MMHG | BODY MASS INDEX: 31.22 KG/M2 | WEIGHT: 235 LBS | TEMPERATURE: 99.8 F | OXYGEN SATURATION: 95 % | SYSTOLIC BLOOD PRESSURE: 119 MMHG | HEART RATE: 116 BPM | RESPIRATION RATE: 19 BRPM

## 2024-12-06 DIAGNOSIS — J18.9 PNEUMONIA OF RIGHT UPPER LOBE DUE TO INFECTIOUS ORGANISM: Primary | ICD-10-CM

## 2024-12-06 LAB
FLUAV RNA RESP QL NAA+PROBE: NOT DETECTED
FLUBV RNA RESP QL NAA+PROBE: NOT DETECTED
SARS-COV-2 RNA RESP QL NAA+PROBE: NOT DETECTED
SOURCE: NORMAL
SPECIMEN DESCRIPTION: NORMAL

## 2024-12-06 PROCEDURE — 99211 OFF/OP EST MAY X REQ PHY/QHP: CPT

## 2024-12-06 PROCEDURE — 71046 X-RAY EXAM CHEST 2 VIEWS: CPT

## 2024-12-06 PROCEDURE — 87636 SARSCOV2 & INF A&B AMP PRB: CPT

## 2024-12-06 RX ORDER — DOXYCYCLINE HYCLATE 100 MG
100 TABLET ORAL 2 TIMES DAILY
Qty: 14 TABLET | Refills: 0 | Status: SHIPPED | OUTPATIENT
Start: 2024-12-06 | End: 2024-12-13

## 2024-12-06 RX ORDER — METHYLPREDNISOLONE 4 MG/1
TABLET ORAL
Qty: 1 KIT | Refills: 0 | Status: SHIPPED | OUTPATIENT
Start: 2024-12-06

## 2024-12-06 RX ORDER — CEFDINIR 300 MG/1
300 CAPSULE ORAL 2 TIMES DAILY
Qty: 14 CAPSULE | Refills: 0 | Status: SHIPPED | OUTPATIENT
Start: 2024-12-06 | End: 2024-12-13

## 2024-12-06 RX ORDER — ALBUTEROL SULFATE 90 UG/1
2 INHALANT RESPIRATORY (INHALATION) 4 TIMES DAILY PRN
Qty: 1 EACH | Refills: 0 | Status: SHIPPED | OUTPATIENT
Start: 2024-12-06

## 2024-12-06 ASSESSMENT — PAIN - FUNCTIONAL ASSESSMENT: PAIN_FUNCTIONAL_ASSESSMENT: 0-10

## 2024-12-06 ASSESSMENT — PAIN DESCRIPTION - FREQUENCY: FREQUENCY: CONTINUOUS

## 2024-12-06 ASSESSMENT — PAIN SCALES - GENERAL: PAINLEVEL_OUTOF10: 5

## 2024-12-06 ASSESSMENT — PAIN DESCRIPTION - DESCRIPTORS: DESCRIPTORS: TIGHTNESS;ACHING;DISCOMFORT

## 2024-12-06 ASSESSMENT — PAIN DESCRIPTION - LOCATION: LOCATION: CHEST;OTHER (COMMENT)

## 2024-12-06 ASSESSMENT — PAIN DESCRIPTION - PAIN TYPE: TYPE: ACUTE PAIN

## 2024-12-06 NOTE — ED PROVIDER NOTES
does suggest early developing right upper lobe pneumonia.  Patient moving air well.  Recommend he be on doxycycline and Omnicef for the infection.  Placed on albuterol inhaler and steroid to help with breathing.  He is taking over-the-counter cough and cold medications and he is fine with that.  Recommend close follow-up with his primary care provider.  Instructions given.  If worse go to the emergency department.         DISCHARGE MEDICATIONS:  New Prescriptions    ALBUTEROL SULFATE HFA (PROVENTIL HFA) 108 (90 BASE) MCG/ACT INHALER    Inhale 2 puffs into the lungs 4 times daily as needed for Wheezing or Shortness of Breath    CEFDINIR (OMNICEF) 300 MG CAPSULE    Take 1 capsule by mouth 2 times daily for 7 days    DOXYCYCLINE HYCLATE (VIBRA-TABS) 100 MG TABLET    Take 1 tablet by mouth 2 times daily for 7 days    METHYLPREDNISOLONE (MEDROL, THEO,) 4 MG TABLET    Take as directed.       DISCONTINUED MEDICATIONS:  Discontinued Medications    No medications on file       PATIENT REFERRED TO:  Grayson Wyatt MD  [address] [facility] [address] [phone]    Schedule an appointment as soon as possible for a visit         --------------------------------- ADDITIONAL PROVIDER NOTES ---------------------------------  I have spoken with the patient and discussed today’s results, in addition to providing specific details for the plan of care and counseling regarding the diagnosis and prognosis.  Their questions are answered at this time and they are agreeable with the plan.   This patient is stable for discharge.  I have shared the specific conditions for return, as well as the importance of follow-up.      * NOTE: (Please note that portions of this note were completed with a voice recognition program.  Efforts were made to edit the dictations but occasionally words are mis-transcribed.)    --------------------------------- IMPRESSION AND DISPOSITION ---------------------------------    IMPRESSION  1. Pneumonia of right  upper lobe due to infectious organism        Disposition: Discharge to home  Patient condition is good    I am the Primary Clinician of Record.     Du Eden PA-C (electronically signed) on 12/6/2024 at 5:09 PM         Du Eden PA-C  12/06/24 [id number]

## 2024-12-06 NOTE — DISCHARGE INSTRUCTIONS
Robitussin DM for cough and congestion.  Tylenol 650 to 1000 mg every 8 hours as needed for pain/fever.  Ibuprofen 600 mg every 8 hours as needed for inflammation/pain/fever. Take with food and water.